# Patient Record
Sex: FEMALE | Race: WHITE | Employment: FULL TIME | URBAN - METROPOLITAN AREA
[De-identification: names, ages, dates, MRNs, and addresses within clinical notes are randomized per-mention and may not be internally consistent; named-entity substitution may affect disease eponyms.]

---

## 2017-04-25 PROBLEM — L84 CORN OF TOE: Status: ACTIVE | Noted: 2017-04-25

## 2017-04-25 PROBLEM — M19.049 PRIMARY OSTEOARTHRITIS OF HAND: Status: ACTIVE | Noted: 2017-04-25

## 2017-10-31 PROBLEM — G56.00 CTS (CARPAL TUNNEL SYNDROME): Status: ACTIVE | Noted: 2017-10-31

## 2018-06-07 PROBLEM — M25.569 KNEE PAIN: Status: ACTIVE | Noted: 2018-06-07

## 2018-12-06 PROBLEM — L29.9 PRURITUS: Status: ACTIVE | Noted: 2018-12-06

## 2019-06-04 PROBLEM — M25.519 SHOULDER PAIN: Status: ACTIVE | Noted: 2019-06-04

## 2019-06-04 PROBLEM — E66.9 OBESITY: Status: ACTIVE | Noted: 2019-06-04

## 2020-04-06 PROBLEM — R74.01 TRANSAMINITIS: Status: ACTIVE | Noted: 2020-04-06

## 2021-01-04 ENCOUNTER — HOSPITAL ENCOUNTER (OUTPATIENT)
Dept: SLEEP MEDICINE | Age: 66
Discharge: HOME | End: 2021-01-04
Payer: COMMERCIAL

## 2021-01-04 PROCEDURE — 95811 POLYSOM 6/>YRS CPAP 4/> PARM: CPT

## 2022-03-18 PROBLEM — M25.569 KNEE PAIN: Status: ACTIVE | Noted: 2018-06-07

## 2022-03-18 PROBLEM — L84 CORN OF TOE: Status: ACTIVE | Noted: 2017-04-25

## 2022-03-18 PROBLEM — L29.9 PRURITUS: Status: ACTIVE | Noted: 2018-12-06

## 2022-03-19 PROBLEM — E66.9 OBESITY: Status: ACTIVE | Noted: 2019-06-04

## 2022-03-19 PROBLEM — M25.519 SHOULDER PAIN: Status: ACTIVE | Noted: 2019-06-04

## 2022-03-19 PROBLEM — R74.01 TRANSAMINITIS: Status: ACTIVE | Noted: 2020-04-06

## 2022-03-19 PROBLEM — G56.00 CTS (CARPAL TUNNEL SYNDROME): Status: ACTIVE | Noted: 2017-10-31

## 2022-03-19 PROBLEM — M19.049 PRIMARY OSTEOARTHRITIS OF HAND: Status: ACTIVE | Noted: 2017-04-25

## 2022-05-26 ENCOUNTER — OFFICE VISIT (OUTPATIENT)
Dept: INTERNAL MEDICINE CLINIC | Facility: CLINIC | Age: 67
End: 2022-05-26
Payer: MEDICARE

## 2022-05-26 VITALS
OXYGEN SATURATION: 98 % | HEART RATE: 79 BPM | RESPIRATION RATE: 16 BRPM | SYSTOLIC BLOOD PRESSURE: 131 MMHG | BODY MASS INDEX: 29.23 KG/M2 | TEMPERATURE: 98.8 F | WEIGHT: 165 LBS | DIASTOLIC BLOOD PRESSURE: 67 MMHG | HEIGHT: 63 IN

## 2022-05-26 DIAGNOSIS — I10 BENIGN ESSENTIAL HYPERTENSION: ICD-10-CM

## 2022-05-26 DIAGNOSIS — Z78.0 POST-MENOPAUSAL: ICD-10-CM

## 2022-05-26 DIAGNOSIS — E78.5 HYPERLIPIDEMIA, UNSPECIFIED HYPERLIPIDEMIA TYPE: Primary | ICD-10-CM

## 2022-05-26 DIAGNOSIS — E11.9 CONTROLLED TYPE 2 DIABETES MELLITUS WITHOUT COMPLICATION, UNSPECIFIED WHETHER LONG TERM INSULIN USE (HCC): ICD-10-CM

## 2022-05-26 DIAGNOSIS — Z23 ENCOUNTER FOR IMMUNIZATION: ICD-10-CM

## 2022-05-26 DIAGNOSIS — Z79.899 ENCOUNTER FOR LONG-TERM (CURRENT) DRUG USE: ICD-10-CM

## 2022-05-26 PROCEDURE — 1123F ACP DISCUSS/DSCN MKR DOCD: CPT | Performed by: INTERNAL MEDICINE

## 2022-05-26 PROCEDURE — 99214 OFFICE O/P EST MOD 30 MIN: CPT | Performed by: INTERNAL MEDICINE

## 2022-05-26 RX ORDER — ZOLPIDEM TARTRATE 10 MG/1
10 TABLET ORAL
COMMUNITY
Start: 2022-05-05 | End: 2022-07-29 | Stop reason: DRUGHIGH

## 2022-05-26 ASSESSMENT — PATIENT HEALTH QUESTIONNAIRE - PHQ9
SUM OF ALL RESPONSES TO PHQ QUESTIONS 1-9: 2
SUM OF ALL RESPONSES TO PHQ9 QUESTIONS 1 & 2: 2
SUM OF ALL RESPONSES TO PHQ QUESTIONS 1-9: 2
1. LITTLE INTEREST OR PLEASURE IN DOING THINGS: 1
2. FEELING DOWN, DEPRESSED OR HOPELESS: 1

## 2022-05-26 NOTE — PROGRESS NOTES
[unfilled]   Location:[facility] [address] [facility]  [place]  Patient #:  [id number]  YOB: 1955        History of Present Illness     Chief Complaint   Patient presents with    Follow-up Chronic Condition     1 year f/u       Ms. Mirian Martinez is a 73 y.o. female  who presents for office visit. Chronic medical issues assessed today diabetes, hypertension, insomnia, JUAN RAMON on CPAP, anxiety and depression  Has not been check ing her BS at all. Has not been exercising regularly. Last eye exam was okay in [place]  Keeps regular dental visits last week. JUAN RAMON on CPAP uses nightly with good results.    Last Labs  CBC:   Lab Results   Component Value Date    WBC 8.4 07/21/2021    RBC 4.56 07/21/2021    HGB 14.4 07/21/2021    HCT 43.5 07/21/2021    MCV 95 07/21/2021    MCH 31.6 07/21/2021    MCHC 33.1 07/21/2021    RDW 12.0 07/21/2021     07/21/2021     CMP:    Lab Results   Component Value Date     07/21/2021    K 3.6 07/21/2021    CL 98 07/21/2021    CO2 23 07/21/2021    BUN 21 07/21/2021    CREATININE 0.78 07/21/2021    GFRAA 92 07/21/2021    AGRATIO 2.0 07/21/2021    GLUCOSE 103 07/21/2021    PROT 7.5 07/21/2021    LABALBU 5.0 07/21/2021    CALCIUM 9.5 07/21/2021    BILITOT <0.2 07/21/2021    ALKPHOS 109 07/21/2021    AST 21 07/21/2021    ALT 22 07/21/2021     HgBA1c:    Lab Results   Component Value Date    LABA1C 6.3 07/21/2021     FLP:    Lab Results   Component Value Date    TRIG 80 01/04/2021    HDL 60 01/04/2021    LDLCALC 116 01/04/2021    LABVLDL 30 12/03/2019     TSH:    Lab Results   Component Value Date    TSH 1.460 07/21/2021       No Known Allergies  Past Medical History:   Diagnosis Date    Anxiety 10/28/2015    Benign essential hypertension 10/28/2015    Cervical radiculopathy 10/28/2015    Chest pain 10/28/2015    Constipation 10/28/2015    Dermatitis 10/28/2015    Diabetes mellitus type 2, controlled ([facility] 10/28/2015    Fibromyalgia 10/28/2015    Heart murmur     Hyperlipidemia 10/28/2015    Insomnia 10/28/2015    Osteoporosis 10/28/2015    Sleep apnea 10/28/2015    Venous (peripheral) insufficiency 10/28/2015    Ventricular septal defect 10/28/2015     Social History     Socioeconomic History    Marital status:      Spouse name: None    Number of children: None    Years of education: None    Highest education level: None   Occupational History    None   Tobacco Use    Smoking status: Never Smoker    Smokeless tobacco: Never Used   Substance and Sexual Activity    Alcohol use: No     Alcohol/week: 0.0 standard drinks    Drug use: No    Sexual activity: None   Other Topics Concern    None   Social History Narrative    None     Social Determinants of Health     Financial Resource Strain:     Difficulty of Paying Living Expenses: Not on file   Food Insecurity:     Worried About Running Out of Food in the Last Year: Not on file    Gadiel of Food in the Last Year: Not on file   Transportation Needs:     Lack of Transportation (Medical): Not on file    Lack of Transportation (Non-Medical):  Not on file   Physical Activity:     Days of Exercise per Week: Not on file    Minutes of Exercise per Session: Not on file   Stress:     Feeling of Stress : Not on file   Social Connections:     Frequency of Communication with Friends and Family: Not on file    Frequency of Social Gatherings with Friends and Family: Not on file    Attends Holiness Services: Not on file    Active Member of Clubs or Organizations: Not on file    Attends Club or Organization Meetings: Not on file    Marital Status: Not on file   Intimate Partner Violence:     Fear of Current or Ex-Partner: Not on file    Emotionally Abused: Not on file    Physically Abused: Not on file    Sexually Abused: Not on file   Housing Stability:     Unable to Pay for Housing in the Last Year: Not on file    Number of [place] in the Last Year: Not on file  Unstable Housing in the Last Year: Not on file     Past Surgical History:   Procedure Laterality Date    FABRIZIO AND BSO  12/1998   [facility]     History reviewed. No pertinent family history. Current Outpatient Medications   Medication Sig Dispense Refill    zolpidem (AMBIEN) 10 MG tablet Take 10 mg by mouth.  amLODIPine-benazepril (LOTREL) 10-40 MG per capsule TAKE 1 CAPSULE BY MOUTH EVERY DAY      chlorthalidone (HYGROTON) 25 MG tablet Take 12.5 mg by mouth daily      linaclotide (LINZESS) 145 MCG capsule Take 145 mcg by mouth every morning (before breakfast)      LORazepam (ATIVAN) 1 MG tablet TAKE 1 TABLET BY MOUTH TWICE DAILY AS NEEDED FOR ANXIETY      metoprolol succinate (TOPROL XL) 100 MG extended release tablet TAKE 1/2 TABLET BY MOUTH TWICE DAILY      simvastatin (ZOCOR) 40 MG tablet TAKE 1 TABLET BY MOUTH EVERY DAY AT NIGHT      spironolactone (ALDACTONE) 25 MG tablet TAKE 1 TABLET BY MOUTH EVERY DAY FOR HIGH BLOOD PRESSURE      venlafaxine (EFFEXOR XR) 150 MG extended release capsule TAKE 1 CAPSULE BY MOUTH EVERY DAY       No current facility-administered medications for this visit.      Health Maintenance   Topic Date Due    Annual Wellness Visit (AWV)  Never done    Pneumococcal 65+ years Vaccine (1 - PCV) Never done    Diabetic foot exam  Never done    Lipids  Never done    HIV screen  Never done    Diabetic microalbuminuria test  Never done    Diabetic retinal exam  Never done    DTaP/Tdap/Td vaccine (1 - Tdap) Never done    Shingles vaccine (1 of 2) Never done    DEXA (modify frequency per FRAX score)  Never done    COVID-19 Vaccine (3 - Booster for Moderna series) 09/13/2021    Depression Screen  07/20/2022    A1C test (Diabetic or Prediabetic)  07/21/2022    Flu vaccine (Season Ended) 09/01/2022    Breast cancer screen  02/25/2024    Colorectal Cancer Screen  12/20/2028    Hepatitis C screen  Completed    Hepatitis A vaccine  Aged Out    Hib vaccine Aged Out    Meningococcal (ACWY) vaccine  Aged Out             Review of Systems  Review of Systems   Constitutional: Negative for fever. Respiratory: Negative for cough and shortness of breath. Cardiovascular: Negative for chest pain. Gastrointestinal: Negative for abdominal pain. Genitourinary: Negative for difficulty urinating. Musculoskeletal: Positive for arthralgias. /67 (Site: Left Upper Arm, Position: Sitting)   Pulse 79   Temp 98.8 °F (37.1 °C) (Temporal)   Resp 16   Ht 5' 3.25\" (1.607 m)   Wt 165 lb (74.8 kg)   SpO2 98%   BMI 29.00 kg/m²       Physical Exam    Physical Exam  Vitals and nursing note reviewed. Constitutional:       General: She is not in acute distress. Appearance: Normal appearance. She is well-groomed and overweight. She is not ill-appearing. HENT:      Head: Normocephalic and atraumatic. Right Ear: Tympanic membrane normal.      Left Ear: Tympanic membrane normal.      Nose: Nose normal.      Mouth/Throat:      Lips: Pink. Mouth: Mucous membranes are moist.   Eyes:      General: Lids are normal.      Extraocular Movements: Extraocular movements intact. Conjunctiva/sclera: Conjunctivae normal.      Pupils: Pupils are equal, round, and reactive to light. Neck:      Thyroid: No thyromegaly. Vascular: No carotid bruit. Cardiovascular:      Rate and Rhythm: Normal rate and regular rhythm. Pulses: Normal pulses. Pulmonary:      Effort: Pulmonary effort is normal.      Breath sounds: Normal breath sounds and air entry. Chest:   Breasts:      Right: Normal. No axillary adenopathy or supraclavicular adenopathy. Left: Normal. No mass, axillary adenopathy or supraclavicular adenopathy. Musculoskeletal:      Right lower leg: No edema. Left lower leg: No edema. Lymphadenopathy:      Cervical: No cervical adenopathy. Upper Body:      Right upper body: No supraclavicular or axillary adenopathy.       Left upper body: No supraclavicular or axillary adenopathy. Neurological:      Mental Status: She is alert. Psychiatric:         Behavior: Behavior is cooperative. Diabetic foot exam:   Left Foot:   Visual Exam: callous- medial great toe   Pulse DP: 2+ (normal)   Filament test: normal sensation   Vibratory Sensation: normal  Right Foot:   Visual Exam: callous- medial great toe   Pulse DP: 2+ (normal)   Filament test: normal sensation   Vibratory Sensation: normal      Wt Readings from Last 3 Encounters:   05/26/22 165 lb (74.8 kg)   07/20/21 167 lb (75.8 kg)       Assessment & Plan    Encounter Diagnoses   Name Primary?  Hyperlipidemia, unspecified hyperlipidemia type Yes    Controlled type 2 diabetes mellitus without complication, unspecified whether long term insulin use (HCC)     Benign essential hypertension     Encounter for long-term (current) drug use     Encounter for immunization     Post-menopausal            Orders Placed This Encounter   Procedures    DEXA Bone Density Axial Skeleton     Standing Status:   Future     Standing Expiration Date:   5/26/2023    Pneumococcal, PREVNAR 20, PCV20, (age 25 yrs+), IM, PF    Microalbumin / Creatinine Urine Ratio     Standing Status:   Future     Standing Expiration Date:   5/26/2023    CBC with Auto Differential     Standing Status:   Future     Standing Expiration Date:   5/26/2023    Comprehensive Metabolic Panel     Standing Status:   Future     Standing Expiration Date:   5/26/2023    TSH     Standing Status:   Future     Standing Expiration Date:   5/26/2023    Lipid Panel     Standing Status:   Future     Standing Expiration Date:   5/26/2023     Order Specific Question:   Is Patient Fasting?/# of Hours     Answer:   fasting    Hemoglobin A1C     Standing Status:   Future     Standing Expiration Date:   5/26/2023       Return for fasting labs. Check labs to assess diabetes control, renal fxn, lipids, and thyroid.   Discussed the importance of regular exercise and a healthy diet. Discussed the importance of regular breast exams and mammograms. BSE reviewed with patient. Reviewed recommended screenings      Return in about 6 months (around 11/26/2022) for routine follow up of chronic medical issues, as needed for new or worsening problems.       Jyothi Reyes MD

## 2022-05-27 DIAGNOSIS — Z00.00 ROUTINE GENERAL MEDICAL EXAMINATION AT A HEALTH CARE FACILITY: Primary | ICD-10-CM

## 2022-05-29 ASSESSMENT — ENCOUNTER SYMPTOMS
ABDOMINAL PAIN: 0
SHORTNESS OF BREATH: 0
COUGH: 0

## 2022-05-31 PROCEDURE — 90677 PCV20 VACCINE IM: CPT | Performed by: INTERNAL MEDICINE

## 2022-06-01 ENCOUNTER — NURSE ONLY (OUTPATIENT)
Dept: INTERNAL MEDICINE CLINIC | Facility: CLINIC | Age: 67
End: 2022-06-01

## 2022-06-01 DIAGNOSIS — I10 BENIGN ESSENTIAL HYPERTENSION: ICD-10-CM

## 2022-06-01 DIAGNOSIS — E11.9 CONTROLLED TYPE 2 DIABETES MELLITUS WITHOUT COMPLICATION, UNSPECIFIED WHETHER LONG TERM INSULIN USE (HCC): ICD-10-CM

## 2022-06-01 DIAGNOSIS — E78.5 HYPERLIPIDEMIA, UNSPECIFIED HYPERLIPIDEMIA TYPE: ICD-10-CM

## 2022-06-02 LAB
ALBUMIN SERPL-MCNC: 4 G/DL (ref 3.2–4.6)
ALBUMIN/GLOB SERPL: 1 {RATIO} (ref 1.2–3.5)
ALP SERPL-CCNC: 112 U/L (ref 50–136)
ALT SERPL-CCNC: 44 U/L (ref 12–65)
ANION GAP SERPL CALC-SCNC: 6 MMOL/L (ref 7–16)
AST SERPL-CCNC: 20 U/L (ref 15–37)
BASOPHILS # BLD: 0 K/UL (ref 0–0.2)
BASOPHILS NFR BLD: 1 % (ref 0–2)
BILIRUB SERPL-MCNC: 0.4 MG/DL (ref 0.2–1.1)
BUN SERPL-MCNC: 22 MG/DL (ref 8–23)
CALCIUM SERPL-MCNC: 9.7 MG/DL (ref 8.3–10.4)
CHLORIDE SERPL-SCNC: 102 MMOL/L (ref 98–107)
CHOLEST SERPL-MCNC: 155 MG/DL
CO2 SERPL-SCNC: 28 MMOL/L (ref 21–32)
CREAT SERPL-MCNC: 0.8 MG/DL (ref 0.6–1)
CREAT UR-MCNC: 197 MG/DL
DIFFERENTIAL METHOD BLD: NORMAL
EOSINOPHIL # BLD: 0.3 K/UL (ref 0–0.8)
EOSINOPHIL NFR BLD: 4 % (ref 0.5–7.8)
ERYTHROCYTE [DISTWIDTH] IN BLOOD BY AUTOMATED COUNT: 11.9 % (ref 11.9–14.6)
EST. AVERAGE GLUCOSE BLD GHB EST-MCNC: 146 MG/DL
GLOBULIN SER CALC-MCNC: 3.9 G/DL (ref 2.3–3.5)
GLUCOSE SERPL-MCNC: 165 MG/DL (ref 65–100)
HBA1C MFR BLD: 6.7 % (ref 4.2–6.3)
HCT VFR BLD AUTO: 41.8 % (ref 35.8–46.3)
HDLC SERPL-MCNC: 37 MG/DL (ref 40–60)
HDLC SERPL: 4.2 {RATIO}
HGB BLD-MCNC: 14.1 G/DL (ref 11.7–15.4)
IMM GRANULOCYTES # BLD AUTO: 0 K/UL (ref 0–0.5)
IMM GRANULOCYTES NFR BLD AUTO: 1 % (ref 0–5)
LDLC SERPL CALC-MCNC: 88.8 MG/DL
LYMPHOCYTES # BLD: 1.7 K/UL (ref 0.5–4.6)
LYMPHOCYTES NFR BLD: 22 % (ref 13–44)
MCH RBC QN AUTO: 30.5 PG (ref 26.1–32.9)
MCHC RBC AUTO-ENTMCNC: 33.7 G/DL (ref 31.4–35)
MCV RBC AUTO: 90.3 FL (ref 79.6–97.8)
MICROALBUMIN UR-MCNC: 3.89 MG/DL
MICROALBUMIN/CREAT UR-RTO: 20 MG/G
MONOCYTES # BLD: 0.7 K/UL (ref 0.1–1.3)
MONOCYTES NFR BLD: 10 % (ref 4–12)
NEUTS SEG # BLD: 4.8 K/UL (ref 1.7–8.2)
NEUTS SEG NFR BLD: 62 % (ref 43–78)
NRBC # BLD: 0 K/UL (ref 0–0.2)
PLATELET # BLD AUTO: 323 K/UL (ref 150–450)
PMV BLD AUTO: 10.1 FL (ref 9.4–12.3)
POTASSIUM SERPL-SCNC: 4.2 MMOL/L (ref 3.5–5.1)
PROT SERPL-MCNC: 7.9 G/DL (ref 6.3–8.2)
RBC # BLD AUTO: 4.63 M/UL (ref 4.05–5.2)
SODIUM SERPL-SCNC: 136 MMOL/L (ref 136–145)
TRIGL SERPL-MCNC: 146 MG/DL (ref 35–150)
TSH, 3RD GENERATION: 1.24 UIU/ML (ref 0.36–3.74)
VLDLC SERPL CALC-MCNC: 29.2 MG/DL (ref 6–23)
WBC # BLD AUTO: 7.6 K/UL (ref 4.3–11.1)

## 2022-06-07 ENCOUNTER — TELEPHONE (OUTPATIENT)
Dept: INTERNAL MEDICINE CLINIC | Facility: CLINIC | Age: 67
End: 2022-06-07

## 2022-06-27 ENCOUNTER — HOSPITAL ENCOUNTER (OUTPATIENT)
Dept: MAMMOGRAPHY | Age: 67
Discharge: HOME | End: 2022-06-30
Payer: MEDICARE

## 2022-06-27 DIAGNOSIS — Z78.0 POST-MENOPAUSAL: ICD-10-CM

## 2022-06-27 PROCEDURE — 77080 DXA BONE DENSITY AXIAL: CPT

## 2022-07-20 ENCOUNTER — OFFICE VISIT (OUTPATIENT)
Dept: INTERNAL MEDICINE CLINIC | Facility: CLINIC | Age: 67
End: 2022-07-20
Payer: MEDICARE

## 2022-07-20 VITALS
TEMPERATURE: 97.3 F | HEART RATE: 65 BPM | WEIGHT: 168 LBS | HEIGHT: 63 IN | RESPIRATION RATE: 16 BRPM | DIASTOLIC BLOOD PRESSURE: 67 MMHG | SYSTOLIC BLOOD PRESSURE: 130 MMHG | OXYGEN SATURATION: 99 % | BODY MASS INDEX: 29.77 KG/M2

## 2022-07-20 DIAGNOSIS — R21 RASH AND NONSPECIFIC SKIN ERUPTION: ICD-10-CM

## 2022-07-20 DIAGNOSIS — L03.116 CELLULITIS OF LEFT LOWER EXTREMITY: Primary | ICD-10-CM

## 2022-07-20 PROCEDURE — 1123F ACP DISCUSS/DSCN MKR DOCD: CPT | Performed by: NURSE PRACTITIONER

## 2022-07-20 PROCEDURE — 99213 OFFICE O/P EST LOW 20 MIN: CPT | Performed by: NURSE PRACTITIONER

## 2022-07-20 RX ORDER — HYDROXYZINE HYDROCHLORIDE 25 MG/1
25 TABLET, FILM COATED ORAL
Qty: 15 TABLET | Refills: 0 | Status: SHIPPED | OUTPATIENT
Start: 2022-07-20 | End: 2022-08-19

## 2022-07-20 RX ORDER — CEPHALEXIN 500 MG/1
500 CAPSULE ORAL 3 TIMES DAILY
Qty: 15 CAPSULE | Refills: 0 | Status: SHIPPED | OUTPATIENT
Start: 2022-07-20 | End: 2022-07-25

## 2022-07-20 ASSESSMENT — PATIENT HEALTH QUESTIONNAIRE - PHQ9
SUM OF ALL RESPONSES TO PHQ QUESTIONS 1-9: 0
SUM OF ALL RESPONSES TO PHQ9 QUESTIONS 1 & 2: 0
1. LITTLE INTEREST OR PLEASURE IN DOING THINGS: 0
2. FEELING DOWN, DEPRESSED OR HOPELESS: 0

## 2022-07-20 ASSESSMENT — ENCOUNTER SYMPTOMS
SHORTNESS OF BREATH: 0
COLOR CHANGE: 1
WHEEZING: 0

## 2022-07-20 NOTE — PROGRESS NOTES
7/20/2022 11:01 AM  Location:[facility] [address] [facility]  [place]  Patient #:  [id number]  YOB: 1955          YOUR LAST HEMOGLOBIN A1CS:   No results found for: HBA1C, [id number]    YOUR LAST LIPID PROFILE:   Lab Results   Component Value Date/Time    CHOL 155 06/01/2022 08:32 AM    HDL 37 06/01/2022 08:32 AM    VLDL 15 01/04/2021 11:08 AM         Lab Results   Component Value Date/Time    GFRAA >60 06/01/2022 08:32 AM    BUN 22 06/01/2022 08:32 AM     06/01/2022 08:32 AM    K 4.2 06/01/2022 08:32 AM     06/01/2022 08:32 AM    CO2 28 06/01/2022 08:32 AM           History of Present Illness     Chief Complaint   Patient presents with    Insect Bite     X 1 week       Ms. Penelope Monroe is a 78 y.o. female  who presents for itchy skin rash. Patient reports 12 days ago she was sitting on her front porch on a Friday evening. She awoke Saturday morning itching and noticed red lesions develop to her lower legs bilaterally. She reports continued itching, somewhat improved with Zyrtec. She denies any purulent drainage, streaking redness, fevers or chills, nausea or vomiting. She is not a diabetic. She denies wheezing. She denies medication or dietary changes. She denies any exposure to any recent illness. Reports scattered itchy places to arms and chest as well. She did not see a particular insect but notes that across the street a new house is being built and her neighbor told her that there was a particular bug that lives in the soil that could have cause her rash and so she became worried and came in for evaluation. Rash  This is a new problem. The current episode started 1 to 4 weeks ago. The problem is unchanged. The affected locations include the right lower leg, right upper leg, left upper leg and left lower leg. The rash is characterized by itchiness and redness. She was exposed to an insect bite/sting.  Pertinent negatives include no anorexia, facial edema, fatigue, fever or shortness of breath. Past treatments include antihistamine. The treatment provided moderate relief. There is no history of allergies, asthma, eczema or varicella. No Known Allergies  Past Medical History:   Diagnosis Date    Anxiety 10/28/2015    Benign essential hypertension 10/28/2015    Cervical radiculopathy 10/28/2015    Chest pain 10/28/2015    Constipation 10/28/2015    Dermatitis 10/28/2015    Diabetes mellitus type 2, controlled ([facility] 10/28/2015    Fibromyalgia 10/28/2015    Heart murmur     Hyperlipidemia 10/28/2015    Insomnia 10/28/2015    Osteoporosis 10/28/2015    Sleep apnea 10/28/2015    Venous (peripheral) insufficiency 10/28/2015    Ventricular septal defect 10/28/2015     Social History     Socioeconomic History    Marital status:      Spouse name: None    Number of children: None    Years of education: None    Highest education level: None   Tobacco Use    Smoking status: Never    Smokeless tobacco: Never   Substance and Sexual Activity    Alcohol use: No     Alcohol/week: 0.0 standard drinks    Drug use: No     Past Surgical History:   Procedure Laterality Date    FABRIZIO AND BSO (CERVIX REMOVED)  12/1998    TUBAL LIGATION  1984     Current Outpatient Medications   Medication Sig Dispense Refill    zolpidem (AMBIEN) 10 MG tablet Take 10 mg by mouth.       amLODIPine-benazepril (LOTREL) 10-40 MG per capsule TAKE 1 CAPSULE BY MOUTH EVERY DAY      chlorthalidone (HYGROTON) 25 MG tablet Take 12.5 mg by mouth daily      linaclotide (LINZESS) 145 MCG capsule Take 145 mcg by mouth every morning (before breakfast)      LORazepam (ATIVAN) 1 MG tablet TAKE 1 TABLET BY MOUTH TWICE DAILY AS NEEDED FOR ANXIETY      metoprolol succinate (TOPROL XL) 100 MG extended release tablet TAKE 1/2 TABLET BY MOUTH TWICE DAILY      simvastatin (ZOCOR) 40 MG tablet TAKE 1 TABLET BY MOUTH EVERY DAY AT NIGHT      spironolactone (ALDACTONE) 25 MG tablet TAKE 1 TABLET BY MOUTH EVERY DAY FOR HIGH BLOOD PRESSURE      venlafaxine (EFFEXOR XR) 150 MG extended release capsule TAKE 1 CAPSULE BY MOUTH EVERY DAY       No current facility-administered medications for this visit. Health Maintenance   Topic Date Due    DTaP/Tdap/Td vaccine (1 - Tdap) Never done    Shingles vaccine (1 of 2) Never done    Diabetic foot exam  07/01/2021    COVID-19 Vaccine (3 - Booster for [employer] series) 09/13/2021    Diabetic retinal exam  06/16/2022    Flu vaccine (1) 09/01/2022    Annual Wellness Visit (AWV)  04/06/2023    Depression Screen  05/26/2023    A1C test (Diabetic or Prediabetic)  06/01/2023    Diabetic microalbuminuria test  06/01/2023    Lipids  06/01/2023    Breast cancer screen  02/25/2024    Colorectal Cancer Screen  12/20/2028    DEXA (modify frequency per FRAX score)  Completed    Pneumococcal 65+ years Vaccine  Completed    Hepatitis C screen  Completed    Hepatitis A vaccine  Aged Out    Hib vaccine  Aged Out    Meningococcal (ACWY) vaccine  Aged Out     No family history on file. Review of Systems  Review of Systems   Constitutional:  Negative for chills, fatigue and fever. Respiratory:  Negative for shortness of breath and wheezing. Cardiovascular:  Negative for chest pain, palpitations and leg swelling. Gastrointestinal:  Negative for anorexia. Skin:  Positive for color change, rash and wound. All other systems reviewed and are negative. /67 (Site: Right Upper Arm, Position: Sitting)   Pulse 65   Temp 97.3 °F (36.3 °C) (Temporal)   Resp 16   Ht 5' 3.25\" (1.607 m)   Wt 168 lb (76.2 kg)   SpO2 99%   BMI 29.53 kg/m²       Physical Exam    Physical Exam  Vitals and nursing note reviewed. Constitutional:       General: She is not in acute distress. Appearance: She is not ill-appearing, toxic-appearing or diaphoretic. HENT:      Mouth/Throat:      Mouth: Mucous membranes are moist.   Cardiovascular:      Rate and Rhythm: Regular rhythm.    Pulmonary:      Effort: No day, avoid itching  - triamcinolone (KENALOG) 0.1 % ointment; Apply topically 2 times daily for 7 days  Dispense: 1 each; Refill: 0  - hydrOXYzine HCl (ATARAX) 25 MG tablet; Take 1 tablet by mouth nightly as needed for Itching  Dispense: 15 tablet;  Refill: 0        Eli Sanchez NP, APRN - CNP

## 2022-07-28 NOTE — PROGRESS NOTES
The patient is seen by synchronous (real-time) audio-video technology on D[employer]. me. Consent:  The patient/healthcare decision maker is aware that this patient-initiated Telehealth encounter is a billable service, with coverage as determined by his insurance carrier. The patient is aware that he/she may receive a bill and has provided verbal consent to proceed: Yes     I was at the office working remotely while conducting this visit. We discussed the expected course, resolution and complications of the diagnosis(es) in detail. Medication risks, benefits, costs, interactions, and alternatives were discussed as indicated. I advised him to contact the office if his condition worsens, changes or fails to improve as anticipated. He expressed understanding with the diagnosis(es) and plan. Pursuant to the emergency declaration under the [address] [place] [address], [facility] waiver authority and the [employer] Act, this Virtual  Visit was conducted, with patient's consent, to reduce the patient's risk of exposure to COVID-19 and provide continuity of care for an established patient. Services were provided through a video synchronous discussion virtually to substitute for in-person clinic visit.                      [address]bie [address] [place] [address], [address] [place] [address]  [phone]    Patient ID:  Name: Jackson Kamara  MRN: [id number]  AGE: 78 y.o.  : 1955          Office Visit 2022    CHIEF COMPLAINT:    Chief Complaint   Patient presents with    CPAP/BiPAP    Medication Refill    Follow-up       HISTORY OF PRESENT ILLNESS:    Pt is a 79 yo  female with a history of fibromyalgia, HTN, HLD, VSD, PVD, and DM2. Pt had a PSG/HST on 21 with an AHI of 22.5/hr with desaturations  to 72%. She is on CPAP 14cm H2O. Pt is seen today for follow up. Pt reports that she has not been using her machine like she is supposed to. She hasn't been feeling very well. She thinks that she had COVID because several other family members have had it. She has not tested it for it. She is just now starting to feel a little bit better. She is taking ambien 10mg. She reports that some nights she is able to sleep and other nights, she is awake all night long. We discussed trying the controlled release Ambien and she is agreeable to try it. She has only used CPAP 47/83 days with an average use of 4 hrs and 30 mins per night. Pt has a mask leak of 1.6L/min with an AHI of 2.1/hr. Pt is benefiting and tolerating from PAP therapy.        ALLERGIES: No Known Allergies       Past Medical History:   Past Medical History:   Diagnosis Date    Anxiety 10/28/2015    Benign essential hypertension 10/28/2015    Cervical radiculopathy 10/28/2015    Chest pain 10/28/2015    Constipation 10/28/2015    Dermatitis 10/28/2015    Diabetes mellitus type 2, controlled ([address] 10/28/2015    Fibromyalgia 10/28/2015    Heart murmur     Hyperlipidemia 10/28/2015    Insomnia 10/28/2015    Osteoporosis 10/28/2015    Sleep apnea 10/28/2015    Venous (peripheral) insufficiency 10/28/2015    Ventricular septal defect 10/28/2015         Problem List:  Patient Active Problem List   Diagnosis    Hyperlipidemia    Ventricular septal defect    Pruritus    Constipation    Knee pain    Fibromyalgia    Corn of toe    CTS (carpal tunnel syndrome)    Benign essential hypertension    Osteoporosis    Venous (peripheral) insufficiency    Transaminitis    Encounter for long-term (current) drug use    Insomnia    Cervical radiculopathy    Diabetes mellitus type 2, controlled ([address]    Sleep apnea    Generalized anxiety disorder    Primary osteoarthritis of hand    Obesity    Shoulder pain    Dermatitis           Surgical History:   Past Surgical History:   Procedure Laterality Date    FABRIZIO AND BSO (CERVIX REMOVED)  12/1998    TUBAL LIGATION  1984       Pulmonary Studies: No flowsheet data found. Social History:   Social History     Socioeconomic History    Marital status:      Spouse name: Not on file    Number of children: Not on file    Years of education: Not on file    Highest education level: Not on file   Occupational History    Not on file   Tobacco Use    Smoking status: Never    Smokeless tobacco: Never   Substance and Sexual Activity    Alcohol use: No     Alcohol/week: 0.0 standard drinks    Drug use: No    Sexual activity: Not on file   Other Topics Concern    Not on file   Social History Narrative    Not on file     Social Determinants of Health     Financial Resource Strain: Not on file   Food Insecurity: Not on file   Transportation Needs: Not on file   Physical Activity: Not on file   Stress: Not on file   Social Connections: Not on file   Intimate Partner Violence: Not on file   Housing Stability: Not on file         Family History: History reviewed. No pertinent family history. Patient Medications:   Current Outpatient Medications   Medication Sig    zolpidem (AMBIEN CR) 12.5 MG extended release tablet Take 1 tablet by mouth nightly as needed for Sleep for up to 180 days.     hydrOXYzine HCl (ATARAX) 25 MG tablet Take 1 tablet by mouth nightly as needed for Itching    amLODIPine-benazepril (LOTREL) 10-40 MG per capsule TAKE 1 CAPSULE BY MOUTH EVERY DAY    chlorthalidone (HYGROTON) 25 MG tablet Take 12.5 mg by mouth daily    linaclotide (LINZESS) 145 MCG capsule Take 145 mcg by mouth every morning (before breakfast)    LORazepam (ATIVAN) 1 MG tablet TAKE 1 TABLET BY MOUTH TWICE DAILY AS NEEDED FOR ANXIETY    metoprolol succinate (TOPROL XL) 100 MG extended release tablet TAKE 1/2 TABLET BY MOUTH TWICE DAILY    simvastatin (ZOCOR) 40 MG tablet TAKE 1 TABLET BY MOUTH EVERY DAY AT NIGHT    spironolactone (ALDACTONE) 25 MG tablet TAKE 1 TABLET BY MOUTH EVERY DAY FOR HIGH BLOOD PRESSURE    venlafaxine (EFFEXOR XR) 150 MG extended release capsule TAKE 1 CAPSULE BY MOUTH EVERY DAY     No current facility-administered medications for this visit. .Review of Systems - General ROS: positive for  - fatigue  Psychological ROS: positive for - anxiety and depression  Ophthalmic ROS: positive for - uses glasses  ENT ROS: positive for - nasal congestion  Allergy and Immunology ROS: positive for - seasonal allergies  Hematological and Lymphatic ROS: positive for - bruising  Respiratory ROS: no cough, shortness of breath, or wheezing  Cardiovascular ROS: no chest pain or dyspnea on exertion  Gastrointestinal ROS: no abdominal pain, change in bowel habits, or black or bloody stools  Genito-Urinary ROS: positive for - nocturia  Musculoskeletal ROS: positive for - joint pain and joint stiffness         PHYSICAL EXAM:    There were no vitals filed for this visit. Physical Exam:  GENERAL APPEARANCE:   The patient is normal weight and in no respiratory distress. HEENT: AT/NC. LUNGS:   Normal respiratory effort with symmetrical lung expansion. NEURO:   The patient is alert and oriented to person, place, and time. Memory appears intact and mood is normal.  No gross sensorimotor deficits are present. ASSESSMENT:         Diagnosis Orders   1. Obstructive sleep apnea syndrome -resume use of CPAP. 2. Insomnia, unspecified type -pt will try Ambien CR. She is aware that this will not be continued to be refilled if she is not compliant with PAP therapy as Rafael Álvarez can make her sleep apnea worse. zolpidem (AMBIEN CR) 12.5 MG extended release tablet             Medical Decision Making:       Orders:   Orders Placed This Encounter    zolpidem (AMBIEN CR) 12.5 MG extended release tablet     Sig: Take 1 tablet by mouth nightly as needed for Sleep for up to 180 days. Dispense:  30 tablet     Refill:  5          Dr. Manjeet Gomez was the onsite physician. He was available to answer any questions.        Time spent in preparation, chart review, imaging review and direct patient care was 27 minutes         Patricia Dangma  7/29/2022,    Electronically signed

## 2022-07-29 ENCOUNTER — CLINICAL DOCUMENTATION (OUTPATIENT)
Dept: SLEEP MEDICINE | Age: 67
End: 2022-07-29

## 2022-07-29 ENCOUNTER — TELEMEDICINE (OUTPATIENT)
Dept: SLEEP MEDICINE | Age: 67
End: 2022-07-29
Payer: MEDICARE

## 2022-07-29 DIAGNOSIS — G47.33 OBSTRUCTIVE SLEEP APNEA SYNDROME: Primary | ICD-10-CM

## 2022-07-29 DIAGNOSIS — G47.00 INSOMNIA, UNSPECIFIED TYPE: ICD-10-CM

## 2022-07-29 PROCEDURE — 1123F ACP DISCUSS/DSCN MKR DOCD: CPT | Performed by: PHYSICIAN ASSISTANT

## 2022-07-29 PROCEDURE — 99213 OFFICE O/P EST LOW 20 MIN: CPT | Performed by: PHYSICIAN ASSISTANT

## 2022-07-29 RX ORDER — ZOLPIDEM TARTRATE 12.5 MG/1
12.5 TABLET, FILM COATED, EXTENDED RELEASE ORAL NIGHTLY PRN
Qty: 30 TABLET | Refills: 5 | Status: SHIPPED | OUTPATIENT
Start: 2022-07-29 | End: 2023-01-25

## 2022-07-29 ASSESSMENT — SLEEP AND FATIGUE QUESTIONNAIRES
HOW LIKELY ARE YOU TO NOD OFF OR FALL ASLEEP WHILE SITTING INACTIVE IN A PUBLIC PLACE: 0
HOW LIKELY ARE YOU TO NOD OFF OR FALL ASLEEP WHEN YOU ARE A PASSENGER IN A CAR FOR AN HOUR WITHOUT A BREAK: 2
HOW LIKELY ARE YOU TO NOD OFF OR FALL ASLEEP WHILE WATCHING TV: 1
HOW LIKELY ARE YOU TO NOD OFF OR FALL ASLEEP IN A CAR, WHILE STOPPED FOR A FEW MINUTES IN TRAFFIC: 0
HOW LIKELY ARE YOU TO NOD OFF OR FALL ASLEEP WHILE SITTING AND TALKING TO SOMEONE: 0
HOW LIKELY ARE YOU TO NOD OFF OR FALL ASLEEP WHILE SITTING AND READING: 0
HOW LIKELY ARE YOU TO NOD OFF OR FALL ASLEEP WHILE LYING DOWN TO REST IN THE AFTERNOON WHEN CIRCUMSTANCES PERMIT: 1
HOW LIKELY ARE YOU TO NOD OFF OR FALL ASLEEP WHILE SITTING QUIETLY AFTER LUNCH WITHOUT ALCOHOL: 0
ESS TOTAL SCORE: 4

## 2022-08-11 RX ORDER — VENLAFAXINE HYDROCHLORIDE 150 MG/1
CAPSULE, EXTENDED RELEASE ORAL
Qty: 90 CAPSULE | Refills: 3 | Status: SHIPPED | OUTPATIENT
Start: 2022-08-11

## 2022-08-15 DIAGNOSIS — F41.1 GENERALIZED ANXIETY DISORDER: Primary | ICD-10-CM

## 2022-08-15 RX ORDER — LORAZEPAM 1 MG/1
TABLET ORAL
Qty: 60 TABLET | Refills: 3 | Status: SHIPPED | OUTPATIENT
Start: 2022-08-15 | End: 2023-08-15

## 2022-10-10 ENCOUNTER — TELEPHONE (OUTPATIENT)
Dept: INTERNAL MEDICINE CLINIC | Facility: CLINIC | Age: 67
End: 2022-10-10

## 2022-10-10 NOTE — TELEPHONE ENCOUNTER
[facility] transfer called over for triage. Pt report having fatigue, headache, abd pain with loss of appetite that started on 10/06/22. Pt has not tested herself for covid. Pt has been informed to do a home test for covid and has been scheduled with our NP 10/11/22. Pt is aware if systems get worse go to the ER or an urgent care.

## 2022-10-11 ENCOUNTER — TELEMEDICINE (OUTPATIENT)
Dept: INTERNAL MEDICINE CLINIC | Facility: CLINIC | Age: 67
End: 2022-10-11
Payer: MEDICARE

## 2022-10-11 DIAGNOSIS — J06.9 VIRAL URI WITH COUGH: Primary | ICD-10-CM

## 2022-10-11 PROCEDURE — 99213 OFFICE O/P EST LOW 20 MIN: CPT | Performed by: NURSE PRACTITIONER

## 2022-10-11 PROCEDURE — 1123F ACP DISCUSS/DSCN MKR DOCD: CPT | Performed by: NURSE PRACTITIONER

## 2022-10-11 ASSESSMENT — ENCOUNTER SYMPTOMS
ABDOMINAL PAIN: 1
NAUSEA: 0
VOMITING: 0
DIARRHEA: 0
CONSTIPATION: 0
BLOOD IN STOOL: 0

## 2022-10-11 NOTE — PROGRESS NOTES
Bisi Pugh (:  1955) is a Established patient, here for evaluation of the following:    Assessment & Plan   Below is the assessment and plan developed based on review of pertinent history, physical exam, labs, studies, and medications. 1. Viral URI with cough  Symptoms seem to be resolving and today we discussed continuing supportive care with staying hydrated and taking a multivitamin supplement with vitamin C, D and zinc.  I did offer her a steroid pack for her wheezing and chest tightness which she declines. We also discussed that should she develop any localized abdominal pain that she be evaluated in person. She knows to call the office for any continued symptoms. We decided that as her symptoms are resolving there is no evidence of secondary bacterial infection at this point. Knows to call the office should this change. She appears well and nontoxic on video visit. Subjective   Ms. Yoan Wilkinson reports 5 days of general malaise, low-grade fever, initially she had some left-sided abdominal pain which was crampy in nature and not associated with any nausea or vomiting, diarrhea or constipation, melena or hematochezia. That has resolved and she reports that she has had some off-and-on ear pain as well as headaches, and intermittent dry cough with some chest tightness and wheezing, mild shortness of breath and body aches. She has had both her COVID and flu vaccinations for this year. She reports that today she is feeling somewhat better and has been using Tylenol and Mucinex for her current symptoms. Review of Systems   Constitutional:  Negative for appetite change. Cardiovascular:  Negative for leg swelling. Gastrointestinal:  Positive for abdominal pain (sore all over, worse on left side, improved now). Negative for blood in stool, constipation, diarrhea, nausea and vomiting. Musculoskeletal:  Negative for neck pain.         Objective   Patient-Reported Vitals  No data recorded     Physical Exam  Constitutional:       General: She is not in acute distress. Appearance: She is not ill-appearing, toxic-appearing or diaphoretic. Pulmonary:      Comments: Speaks in complete sentences. No distress. Skin:     Coloration: Skin is not pale. Neurological:      Mental Status: She is oriented to person, place, and time.      [INSTRUCTIONS:  \"[x]\" Indicates a positive item  \"[]\" Indicates a negative item  -- DELETE ALL ITEMS NOT EXAMINED]    Constitutional: [x] Appears well-developed and well-nourished [x] No apparent distress      [] Abnormal -     Mental status: [x] Alert and awake  [x] Oriented to person/place/time [x] Able to follow commands    [] Abnormal -     Eyes:   EOM    [x]  Normal    [] Abnormal -   Sclera  [x]  Normal    [] Abnormal -          Discharge [x]  None visible   [] Abnormal -     HENT: [x] Normocephalic, atraumatic  [] Abnormal -   [x] Mouth/Throat: Mucous membranes are moist    External Ears [x] Normal  [] Abnormal -    Neck: [x] No visualized mass [] Abnormal -     Pulmonary/Chest: [x] Respiratory effort normal   [x] No visualized signs of difficulty breathing or respiratory distress        [] Abnormal -      Musculoskeletal:   [] Normal gait with no signs of ataxia         [x] Normal range of motion of neck        [] Abnormal -     Neurological:        [x] No Facial Asymmetry (Cranial nerve 7 motor function) (limited exam due to video visit)          [x] No gaze palsy        [] Abnormal -          Skin:        [x] No significant exanthematous lesions or discoloration noted on facial skin         [] Abnormal -            Psychiatric:       [x] Normal Affect [] Abnormal -        [x] No Hallucinations    Other pertinent observable physical exam findings:-         On this date 10/11/2022 I have spent 20 minutes reviewing previous notes, test results and face to face (virtual) with the patient discussing the diagnosis and importance of compliance with the treatment plan as well as documenting on the day of the visit. Leatha Krause, was evaluated through a synchronous (real-time) audio-video encounter. The patient (or guardian if applicable) is aware that this is a billable service, which includes applicable co-pays. This Virtual Visit was conducted with patient's (and/or legal guardian's) consent. The visit was conducted pursuant to the emergency declaration under the [address] [place] [address], [address] [place] [address] and the [employer] Act. Patient identification was verified, and a caregiver was present when appropriate. The patient was located at Home: [address] [place], [place] [id number]. Provider was located at [facility] (Appt Dept): [place] [address] [place] [address].         --Antoine Camrago NP, APRN - CNP

## 2022-11-09 RX ORDER — AMLODIPINE BESYLATE AND BENAZEPRIL HYDROCHLORIDE 10; 40 MG/1; MG/1
CAPSULE ORAL
Qty: 90 CAPSULE | Refills: 3 | Status: SHIPPED | OUTPATIENT
Start: 2022-11-09

## 2022-11-09 RX ORDER — CHLORTHALIDONE 25 MG/1
TABLET ORAL
Qty: 45 TABLET | Refills: 11 | Status: SHIPPED | OUTPATIENT
Start: 2022-11-09

## 2022-11-29 ENCOUNTER — OFFICE VISIT (OUTPATIENT)
Dept: INTERNAL MEDICINE CLINIC | Facility: CLINIC | Age: 67
End: 2022-11-29
Payer: MEDICARE

## 2022-11-29 VITALS
TEMPERATURE: 98 F | WEIGHT: 174.8 LBS | DIASTOLIC BLOOD PRESSURE: 58 MMHG | HEIGHT: 63 IN | BODY MASS INDEX: 30.97 KG/M2 | OXYGEN SATURATION: 98 % | SYSTOLIC BLOOD PRESSURE: 137 MMHG | HEART RATE: 74 BPM

## 2022-11-29 DIAGNOSIS — G47.00 INSOMNIA, UNSPECIFIED TYPE: ICD-10-CM

## 2022-11-29 DIAGNOSIS — E11.21 TYPE 2 DIABETES MELLITUS WITH DIABETIC NEPHROPATHY, WITHOUT LONG-TERM CURRENT USE OF INSULIN (HCC): ICD-10-CM

## 2022-11-29 DIAGNOSIS — E11.9 CONTROLLED TYPE 2 DIABETES MELLITUS WITHOUT COMPLICATION, UNSPECIFIED WHETHER LONG TERM INSULIN USE (HCC): Primary | ICD-10-CM

## 2022-11-29 DIAGNOSIS — R05.1 ACUTE COUGH: ICD-10-CM

## 2022-11-29 DIAGNOSIS — J06.9 URI, ACUTE: ICD-10-CM

## 2022-11-29 LAB
ANION GAP SERPL CALC-SCNC: 5 MMOL/L (ref 2–11)
BUN SERPL-MCNC: 18 MG/DL (ref 8–23)
CALCIUM SERPL-MCNC: 9.4 MG/DL (ref 8.3–10.4)
CHLORIDE SERPL-SCNC: 102 MMOL/L (ref 101–110)
CO2 SERPL-SCNC: 27 MMOL/L (ref 21–32)
CREAT SERPL-MCNC: 0.8 MG/DL (ref 0.6–1)
EST. AVERAGE GLUCOSE BLD GHB EST-MCNC: 166 MG/DL
GLUCOSE SERPL-MCNC: 333 MG/DL (ref 65–100)
HBA1C MFR BLD: 7.4 % (ref 4.8–5.6)
POTASSIUM SERPL-SCNC: 4 MMOL/L (ref 3.5–5.1)
SODIUM SERPL-SCNC: 134 MMOL/L (ref 133–143)

## 2022-11-29 PROCEDURE — 3051F HG A1C>EQUAL 7.0%<8.0%: CPT | Performed by: INTERNAL MEDICINE

## 2022-11-29 PROCEDURE — 99214 OFFICE O/P EST MOD 30 MIN: CPT | Performed by: INTERNAL MEDICINE

## 2022-11-29 PROCEDURE — 1123F ACP DISCUSS/DSCN MKR DOCD: CPT | Performed by: INTERNAL MEDICINE

## 2022-11-29 PROCEDURE — 3075F SYST BP GE 130 - 139MM HG: CPT | Performed by: INTERNAL MEDICINE

## 2022-11-29 PROCEDURE — 3078F DIAST BP <80 MM HG: CPT | Performed by: INTERNAL MEDICINE

## 2022-11-29 RX ORDER — AMOXICILLIN AND CLAVULANATE POTASSIUM 875; 125 MG/1; MG/1
1 TABLET, FILM COATED ORAL 2 TIMES DAILY
Qty: 14 TABLET | Refills: 0 | Status: SHIPPED | OUTPATIENT
Start: 2022-11-29 | End: 2022-12-06

## 2022-11-29 RX ORDER — GUAIFENESIN AND CODEINE PHOSPHATE 100; 10 MG/5ML; MG/5ML
5 SOLUTION ORAL 3 TIMES DAILY PRN
Qty: 105 ML | Refills: 0 | Status: SHIPPED | OUTPATIENT
Start: 2022-11-29 | End: 2022-12-06

## 2022-11-29 RX ORDER — ZOLPIDEM TARTRATE 6.25 MG/1
6.25 TABLET, FILM COATED, EXTENDED RELEASE ORAL NIGHTLY PRN
Qty: 30 TABLET | Refills: 5 | Status: SHIPPED | OUTPATIENT
Start: 2022-11-29 | End: 2023-05-28

## 2022-11-29 SDOH — ECONOMIC STABILITY: FOOD INSECURITY: WITHIN THE PAST 12 MONTHS, YOU WORRIED THAT YOUR FOOD WOULD RUN OUT BEFORE YOU GOT MONEY TO BUY MORE.: NEVER TRUE

## 2022-11-29 SDOH — ECONOMIC STABILITY: FOOD INSECURITY: WITHIN THE PAST 12 MONTHS, THE FOOD YOU BOUGHT JUST DIDN'T LAST AND YOU DIDN'T HAVE MONEY TO GET MORE.: NEVER TRUE

## 2022-11-29 ASSESSMENT — PATIENT HEALTH QUESTIONNAIRE - PHQ9
SUM OF ALL RESPONSES TO PHQ QUESTIONS 1-9: 0
1. LITTLE INTEREST OR PLEASURE IN DOING THINGS: 0
SUM OF ALL RESPONSES TO PHQ QUESTIONS 1-9: 0
SUM OF ALL RESPONSES TO PHQ QUESTIONS 1-9: 0
2. FEELING DOWN, DEPRESSED OR HOPELESS: 0
SUM OF ALL RESPONSES TO PHQ QUESTIONS 1-9: 0
SUM OF ALL RESPONSES TO PHQ9 QUESTIONS 1 & 2: 0

## 2022-11-29 ASSESSMENT — SOCIAL DETERMINANTS OF HEALTH (SDOH): HOW HARD IS IT FOR YOU TO PAY FOR THE VERY BASICS LIKE FOOD, HOUSING, MEDICAL CARE, AND HEATING?: NOT HARD AT ALL

## 2022-11-29 NOTE — PROGRESS NOTES
11/29/2022   Location:[facility] [address] [facility]  [place]  Patient #:  [id number]  YOB: 1955        History of Present Illness     Chief Complaint   Patient presents with    6 Month Follow-Up     Pt presents to the office today for their 6 month follow-up. Cough     Started a few days ago with a non productive cough. Congestion     Started a few days ago. No aches, or fever. Ms. Madelaine Carney is a 78 y.o. female  who presents for Follow up on chronic medical issues. There is compliance and tolerance with medications. Chronic medical issues assessed today diabetes, hypertension, insomnia, JUAN RAMON on CPAP, anxiety and depression  Not exercising. Wt is up. Not really checking BS regularly. Last A1c was at goal.    Recurrence of URI symptoms. Had telehealth visit with  Nurse Practitioner Shiela Reynoso last month.    Last Labs  CBC:   Lab Results   Component Value Date/Time    WBC 7.6 06/01/2022 08:32 AM    RBC 4.63 06/01/2022 08:32 AM    HGB 14.1 06/01/2022 08:32 AM    HCT 41.8 06/01/2022 08:32 AM    MCV 90.3 06/01/2022 08:32 AM    MCH 30.5 06/01/2022 08:32 AM    MCHC 33.7 06/01/2022 08:32 AM    RDW 11.9 06/01/2022 08:32 AM     06/01/2022 08:32 AM    MPV 10.1 06/01/2022 08:32 AM     CMP:    Lab Results   Component Value Date/Time     06/01/2022 08:32 AM    K 4.2 06/01/2022 08:32 AM     06/01/2022 08:32 AM    CO2 28 06/01/2022 08:32 AM    BUN 22 06/01/2022 08:32 AM    CREATININE 0.80 06/01/2022 08:32 AM    GFRAA >60 06/01/2022 08:32 AM    AGRATIO 2.0 07/21/2021 08:21 AM    LABGLOM >60 06/01/2022 08:32 AM    GLUCOSE 165 06/01/2022 08:32 AM    PROT 7.9 06/01/2022 08:32 AM    LABALBU 4.0 06/01/2022 08:32 AM    CALCIUM 9.7 06/01/2022 08:32 AM    BILITOT 0.4 06/01/2022 08:32 AM    ALKPHOS 112 06/01/2022 08:32 AM    ALKPHOS 109 07/21/2021 08:21 AM    AST 20 06/01/2022 08:32 AM    ALT 44 06/01/2022 08:32 AM     HgBA1c:    Lab Results   Component Value Date/Time    LABA1C 6.7 06/01/2022 08:32 AM     FLP:    Lab Results   Component Value Date/Time    TRIG 146 06/01/2022 08:32 AM    HDL 37 06/01/2022 08:32 AM    LDLCALC 88.8 06/01/2022 08:32 AM    LABVLDL 29.2 06/01/2022 08:32 AM     TSH:    Lab Results   Component Value Date/Time    TSH 1.460 07/21/2021 08:21 AM       No Known Allergies  Past Medical History:   Diagnosis Date    Anxiety 10/28/2015    Benign essential hypertension 10/28/2015    Cervical radiculopathy 10/28/2015    Chest pain 10/28/2015    Constipation 10/28/2015    Dermatitis 10/28/2015    Diabetes mellitus type 2, controlled ([address] 10/28/2015    Fibromyalgia 10/28/2015    Heart murmur     Hyperlipidemia 10/28/2015    Insomnia 10/28/2015    Osteoporosis 10/28/2015    Sleep apnea 10/28/2015    Venous (peripheral) insufficiency 10/28/2015    Ventricular septal defect 10/28/2015     Social History     Socioeconomic History    Marital status:      Spouse name: None    Number of children: None    Years of education: None    Highest education level: None   Tobacco Use    Smoking status: Never    Smokeless tobacco: Never   Substance and Sexual Activity    Alcohol use: No     Alcohol/week: 0.0 standard drinks    Drug use: No     Social Determinants of Health     Financial Resource Strain: Low Risk     Difficulty of Paying Living Expenses: Not hard at all   Food Insecurity: No Food Insecurity    Worried About Running Out of Food in the Last Year: Never true    Ran Out of Food in the Last Year: Never true     Past Surgical History:   Procedure Laterality Date    FABRIZIO AND BSO (CERVIX REMOVED)  12/1998    [address]     History reviewed. No pertinent family history.   Current Outpatient Medications   Medication Sig Dispense Refill    amLODIPine-benazepril (LOTREL) 10-40 MG per capsule TAKE 1 CAPSULE BY MOUTH EVERY DAY 90 capsule 3    chlorthalidone (HYGROTON) 25 MG tablet TAKE 1/2 TABLET BY MOUTH DAILY 45 tablet 11    LORazepam (ATIVAN) 1 MG tablet TAKE 1 TABLET BY MOUTH TWICE DAILY AS NEEDED FOR ANXIETY 60 tablet 3    venlafaxine (EFFEXOR XR) 150 MG extended release capsule TAKE 1 CAPSULE BY MOUTH EVERY DAY 90 capsule 3    zolpidem (AMBIEN CR) 12.5 MG extended release tablet Take 1 tablet by mouth nightly as needed for Sleep for up to 180 days. 30 tablet 5    linaclotide (LINZESS) 145 MCG capsule Take 145 mcg by mouth every morning (before breakfast)      metoprolol succinate (TOPROL XL) 100 MG extended release tablet TAKE 1/2 TABLET BY MOUTH TWICE DAILY      simvastatin (ZOCOR) 40 MG tablet TAKE 1 TABLET BY MOUTH EVERY DAY AT NIGHT      spironolactone (ALDACTONE) 25 MG tablet TAKE 1 TABLET BY MOUTH EVERY DAY FOR HIGH BLOOD PRESSURE       No current facility-administered medications for this visit. Health Maintenance   Topic Date Due    DTaP/Tdap/Td vaccine (1 - Tdap) Never done    Shingles vaccine (1 of 2) Never done    COVID-19 Vaccine (3 - Booster for Moderna series) 06/08/2021    Diabetic foot exam  07/01/2021    Diabetic retinal exam  06/16/2022    Flu vaccine (1) 08/01/2022    Annual Wellness Visit (AWV)  04/06/2023    A1C test (Diabetic or Prediabetic)  06/01/2023    Diabetic microalbuminuria test  06/01/2023    Lipids  06/01/2023    Depression Screen  07/20/2023    Breast cancer screen  02/25/2024    Colorectal Cancer Screen  12/20/2028    DEXA (modify frequency per FRAX score)  Completed    Pneumococcal 65+ years Vaccine  Completed    Hepatitis C screen  Completed    Hepatitis A vaccine  Aged Out    Hib vaccine  Aged Out    Meningococcal (ACWY) vaccine  Aged Out             Review of Systems  Review of Systems   HENT:  Positive for congestion and sinus pressure. Respiratory:  Positive for cough. Cardiovascular:  Negative for chest pain. Gastrointestinal:  Negative for abdominal pain.      BP (!) 137/58 (Site: Right Upper Arm, Position: Sitting, Cuff Size: Medium Adult)   Pulse 74   Temp 98 °F (36.7 °C) (Temporal)   Ht 5' 3.25\" (1.607 m)   Wt 174 lb 12.8 oz (79.3 kg)   SpO2 98%   BMI 30.72 kg/m²     Wt Readings from Last 3 Encounters:   11/29/22 174 lb 12.8 oz (79.3 kg)   07/20/22 168 lb (76.2 kg)   05/26/22 165 lb (74.8 kg)       Physical Exam    Physical Exam  Vitals and nursing note reviewed. Constitutional:       General: She is not in acute distress. Appearance: Normal appearance. She is not ill-appearing. HENT:      Head: Normocephalic and atraumatic. Nose: Congestion present. Mouth/Throat:      Lips: Pink. Mouth: Mucous membranes are moist.      Pharynx: Oropharynx is clear. Cardiovascular:      Rate and Rhythm: Normal rate and regular rhythm. Pulmonary:      Effort: Pulmonary effort is normal.      Breath sounds: Normal breath sounds. Neurological:      Mental Status: She is alert. Assessment & Plan    Encounter Diagnoses   Name Primary? Controlled type 2 diabetes mellitus without complication, unspecified whether long term insulin use ([facility]) Yes    Insomnia, unspecified type     Type 2 diabetes mellitus with diabetic nephropathy, without long-term current use of insulin ([facility])     Acute cough     URI, acute        Orders Placed This Encounter   Medications    zolpidem (AMBIEN CR) 6.25 MG extended release tablet     Sig: Take 1 tablet by mouth nightly as needed for Sleep for up to 180 days. Dispense:  30 tablet     Refill:  5    amoxicillin-clavulanate (AUGMENTIN) 875-125 MG per tablet     Sig: Take 1 tablet by mouth 2 times daily for 7 days     Dispense:  14 tablet     Refill:  0    guaiFENesin-codeine (TUSSI-ORGANIDIN NR) 100-10 MG/5ML syrup     Sig: Take 5 mLs by mouth 3 times daily as needed for Cough for up to 7 days.      Dispense:  105 mL     Refill:  0     Reduce doses taken as pain becomes manageable       Orders Placed This Encounter   Procedures    Hemoglobin A1C     Standing Status:   Future     Number of Occurrences:   1     Standing Expiration Date:   11/29/2023    Basic Metabolic Panel     Standing Status:   Future     Number of Occurrences:   1     Standing Expiration Date:   11/29/2023     Consider GLP1 if A1c is higher. The patient is asked to make an attempt to improve diet and exercise patterns to aid in medical management of this problem. Discussed the importance of regular exercise and a healthy diet. Avoid sweet/sugary foods and beverages. Limit carbohydrates  in your diet. Carbohydrates like bread, pasta, rice and white potatoes are broken down by the body into glucose which is sugar. Sugar is a source of energy. So the more active you are the more sugar is burned off. Aim for 150 minutes of aerobic exercise over the course of a week. Walking is great exercise. Return in about 6 months (around 5/29/2023) for routine follow up of chronic medical issues.         Tess Valle MD

## 2022-12-05 ENCOUNTER — TELEPHONE (OUTPATIENT)
Dept: INTERNAL MEDICINE CLINIC | Facility: CLINIC | Age: 67
End: 2022-12-05

## 2022-12-06 NOTE — TELEPHONE ENCOUNTER
Diabetes control has worsened. Her A1c was up to 7.4. goal is less than 7. It was 6. 7. her glucose level was 333 on her blood work. Avoid sweet/sugary foods and beverages. Limit carbohydrates  in your diet. Carbohydrates like bread, pasta, rice and white potatoes are broken down by the body into glucose which is sugar. Sugar is a source of energy. So the more active you are the more sugar is burned off. Aim for 150 minutes of aerobic exercise over the course of a week. Walking is great exercise. Will need to consider treatment if she does not think she can make the needed adjust to improve her diabetes control with diet and exercise.    Ashely Modi MD

## 2022-12-12 ASSESSMENT — ENCOUNTER SYMPTOMS
COUGH: 1
SINUS PRESSURE: 1
ABDOMINAL PAIN: 0

## 2022-12-30 ENCOUNTER — TELEPHONE (OUTPATIENT)
Dept: INTERNAL MEDICINE CLINIC | Facility: CLINIC | Age: 67
End: 2022-12-30

## 2022-12-30 DIAGNOSIS — R05.1 ACUTE COUGH: Primary | ICD-10-CM

## 2022-12-30 RX ORDER — GUAIFENESIN AND CODEINE PHOSPHATE 100; 10 MG/5ML; MG/5ML
5-10 SOLUTION ORAL EVERY 4 HOURS PRN
Qty: 200 ML | Refills: 0 | Status: SHIPPED | OUTPATIENT
Start: 2022-12-30 | End: 2023-01-06

## 2022-12-30 NOTE — TELEPHONE ENCOUNTER
Medication Refill Request      Name of Medication : guaiFENesin-codeine      Strength of Medication: 100-10mg/ 5ml       Directions: Take 5ml by mouth 3 times daily as needed for cough 7 days       30 day or 90 day supply:       Preferred Pharmacy: [facility] in [place]    Additional Information For Provider:    She said it didn't really help with the cough, at night she coughs a lot, and has a headache.    I am sending this because Dr. Ruthy Pereira is off

## 2023-02-03 RX ORDER — SIMVASTATIN 40 MG
TABLET ORAL
Qty: 90 TABLET | Refills: 3 | Status: SHIPPED | OUTPATIENT
Start: 2023-02-03

## 2023-02-24 ENCOUNTER — OFFICE VISIT (OUTPATIENT)
Dept: INTERNAL MEDICINE CLINIC | Facility: CLINIC | Age: 68
End: 2023-02-24

## 2023-02-24 VITALS
HEART RATE: 74 BPM | SYSTOLIC BLOOD PRESSURE: 141 MMHG | BODY MASS INDEX: 30.9 KG/M2 | HEIGHT: 63 IN | RESPIRATION RATE: 18 BRPM | DIASTOLIC BLOOD PRESSURE: 70 MMHG | WEIGHT: 174.4 LBS | OXYGEN SATURATION: 97 % | TEMPERATURE: 97.2 F

## 2023-02-24 DIAGNOSIS — F41.1 GENERALIZED ANXIETY DISORDER: ICD-10-CM

## 2023-02-24 DIAGNOSIS — Z76.89 ENCOUNTER FOR WEIGHT MANAGEMENT: Primary | ICD-10-CM

## 2023-02-24 DIAGNOSIS — E66.9 OBESITY (BMI 30-39.9): ICD-10-CM

## 2023-02-24 RX ORDER — BUPROPION HYDROCHLORIDE 150 MG/1
150 TABLET ORAL EVERY MORNING
Qty: 30 TABLET | Refills: 0 | Status: SHIPPED | OUTPATIENT
Start: 2023-02-24

## 2023-02-24 SDOH — ECONOMIC STABILITY: INCOME INSECURITY: HOW HARD IS IT FOR YOU TO PAY FOR THE VERY BASICS LIKE FOOD, HOUSING, MEDICAL CARE, AND HEATING?: SOMEWHAT HARD

## 2023-02-24 SDOH — ECONOMIC STABILITY: HOUSING INSECURITY
IN THE LAST 12 MONTHS, WAS THERE A TIME WHEN YOU DID NOT HAVE A STEADY PLACE TO SLEEP OR SLEPT IN A SHELTER (INCLUDING NOW)?: NO

## 2023-02-24 SDOH — ECONOMIC STABILITY: FOOD INSECURITY: WITHIN THE PAST 12 MONTHS, YOU WORRIED THAT YOUR FOOD WOULD RUN OUT BEFORE YOU GOT MONEY TO BUY MORE.: OFTEN TRUE

## 2023-02-24 SDOH — ECONOMIC STABILITY: FOOD INSECURITY: WITHIN THE PAST 12 MONTHS, THE FOOD YOU BOUGHT JUST DIDN'T LAST AND YOU DIDN'T HAVE MONEY TO GET MORE.: SOMETIMES TRUE

## 2023-02-24 ASSESSMENT — PATIENT HEALTH QUESTIONNAIRE - PHQ9
2. FEELING DOWN, DEPRESSED OR HOPELESS: 0
1. LITTLE INTEREST OR PLEASURE IN DOING THINGS: 0
SUM OF ALL RESPONSES TO PHQ QUESTIONS 1-9: 0
SUM OF ALL RESPONSES TO PHQ9 QUESTIONS 1 & 2: 0
SUM OF ALL RESPONSES TO PHQ QUESTIONS 1-9: 0

## 2023-02-24 ASSESSMENT — ENCOUNTER SYMPTOMS
SHORTNESS OF BREATH: 0
ABDOMINAL PAIN: 0
COUGH: 0

## 2023-02-24 NOTE — PROGRESS NOTES
2023 11:33 AM  Location:[facility] [address] [facility]  [place]  Patient #:  [id number]  YOB: 1955      History of Present Illness     Chief Complaint   Patient presents with    Weight Gain     Weight problem   Wants something to curb her appetite. Ms. Florina Montes is a 78 y.o. female  who presents for wanting appetite suppressant for weight loss. She is feeling down/depressed, cant sleep well. Her weight goes up and down, snacks a lot due to her anxiety . States she cant control her appetite. She has gained approx 8 lb in 1 year, she is not exercising. Hx of anxiety/depression. She has been on effexor for years. She states her anxiety is worsening lately,   several years ago and is going through selling her home and she is eating /snacking a lot, sometimes will eat 6 hot dogs at a time. Hx of tavo, wears cpap. No hx seizure d/o, no hx of etoh use. Hx of vsd. Denies hx of narcotic use. No Known Allergies     Current Outpatient Medications   Medication Sig Dispense Refill    buPROPion (WELLBUTRIN XL) 150 MG extended release tablet Take 1 tablet by mouth every morning 30 tablet 0    simvastatin (ZOCOR) 40 MG tablet TAKE 1 TABLET BY MOUTH EVERY DAY AT NIGHT 90 tablet 3    zolpidem (AMBIEN CR) 6.25 MG extended release tablet Take 1 tablet by mouth nightly as needed for Sleep for up to 180 days.  30 tablet 5    amLODIPine-benazepril (LOTREL) 10-40 MG per capsule TAKE 1 CAPSULE BY MOUTH EVERY DAY 90 capsule 3    chlorthalidone (HYGROTON) 25 MG tablet TAKE 1/2 TABLET BY MOUTH DAILY 45 tablet 11    LORazepam (ATIVAN) 1 MG tablet TAKE 1 TABLET BY MOUTH TWICE DAILY AS NEEDED FOR ANXIETY 60 tablet 3    venlafaxine (EFFEXOR XR) 150 MG extended release capsule TAKE 1 CAPSULE BY MOUTH EVERY DAY 90 capsule 3    linaclotide (LINZESS) 145 MCG capsule Take 145 mcg by mouth every morning (before breakfast)      metoprolol succinate (TOPROL XL) 100 MG extended release tablet TAKE 1/2 TABLET BY MOUTH TWICE DAILY      spironolactone (ALDACTONE) 25 MG tablet TAKE 1 TABLET BY MOUTH EVERY DAY FOR HIGH BLOOD PRESSURE       No current facility-administered medications for this visit. Past Medical History:   Diagnosis Date    Anxiety 10/28/2015    Benign essential hypertension 10/28/2015    Cervical radiculopathy 10/28/2015    Chest pain 10/28/2015    Constipation 10/28/2015    Dermatitis 10/28/2015    Diabetes mellitus type 2, controlled ([facility] [address] 10/28/2015    Fibromyalgia 10/28/2015    Heart murmur     Hyperlipidemia 10/28/2015    Insomnia 10/28/2015    Osteoporosis 10/28/2015    Sleep apnea 10/28/2015    Venous (peripheral) insufficiency 10/28/2015    Ventricular septal defect 10/28/2015        Social History     Socioeconomic History    Marital status:      Spouse name: Not on file    Number of children: Not on file    Years of education: Not on file    Highest education level: Not on file   Occupational History    Not on file   Tobacco Use    Smoking status: Never    Smokeless tobacco: Never   Substance and Sexual Activity    Alcohol use: No     Alcohol/week: 0.0 standard drinks    Drug use: No    Sexual activity: Not on file   Other Topics Concern    Not on file   Social History Narrative    Not on file     Social Determinants of Health     Financial Resource Strain: Medium Risk    Difficulty of Paying Living Expenses: Somewhat hard   Food Insecurity: Food Insecurity Present    Worried About Running Out of Food in the Last Year: Often true    Ran Out of Food in the Last Year: Sometimes true   Transportation Needs: Unknown    Lack of Transportation (Medical): Not on file    Lack of Transportation (Non-Medical):  No   Physical Activity: Not on file   Stress: Not on file   Social Connections: Not on file   Intimate Partner Violence: Not on file   Housing Stability: Unknown    Unable to Pay for Housing in the Last Year: Not on file    Number of Places Lived in the Last Year: Not on file    Unstable Housing in the Last Year: No            Review of Systems    Review of Systems   Constitutional:  Positive for activity change (not exercising), appetite change (increased) and unexpected weight change (gain). Negative for chills, fatigue and fever. Respiratory:  Negative for cough and shortness of breath. Cardiovascular:  Negative for chest pain. Gastrointestinal:  Negative for abdominal pain. All other systems reviewed and are negative. BP (!) 141/70 (Site: Right Upper Arm, Position: Sitting, Cuff Size: Large Adult)   Pulse 74   Temp 97.2 °F (36.2 °C) (Temporal)   Resp 18   Ht 5' 3.25\" (1.607 m)   Wt 174 lb 6.4 oz (79.1 kg)   SpO2 97%   BMI 30.65 kg/m²       Physical Exam    Physical Exam  Constitutional:       General: She is not in acute distress. Appearance: Normal appearance. She is obese. She is not ill-appearing. Cardiovascular:      Rate and Rhythm: Normal rate and regular rhythm. Heart sounds: Normal heart sounds. Pulmonary:      Effort: Pulmonary effort is normal.      Breath sounds: Normal breath sounds. Skin:     General: Skin is warm and dry. Neurological:      Mental Status: She is alert and oriented to person, place, and time. Psychiatric:         Attention and Perception: She is inattentive. Mood and Affect: Mood is anxious. Speech: Speech is rapid and pressured. Behavior: Behavior is hyperactive. Behavior is cooperative. Thought Content: Thought content normal.         Assessment & Plan    Navin Mishra was seen today for weight gain. Diagnoses and all orders for this visit:    Encounter for weight management  -     buPROPion (WELLBUTRIN XL) 150 MG extended release tablet; Take 1 tablet by mouth every morning    Obesity (BMI 30-39. 9)    Generalized anxiety disorder  -     buPROPion (WELLBUTRIN XL) 150 MG extended release tablet;  Take 1 tablet by mouth every morning     Will add wellbutrin, see if this helps with worsening anxiety and in addition helping with weight loss/appetite curbing, could possibly transition to contrave (has naltrexone component) in the future if tolerates wellbutrin, advised to start exercise regimen walking approx 10min/day 3-5 x weekly and slowly building up from there. Hesitant to try adipex due to hx of htn and her age. I saw no significant interaction with effexor and wellbutrin . She will follow up in 1 month for recheck. No orders of the defined types were placed in this encounter. No follow-up provider specified.         GALLITO Song - NP

## 2023-03-24 ENCOUNTER — OFFICE VISIT (OUTPATIENT)
Dept: INTERNAL MEDICINE CLINIC | Facility: CLINIC | Age: 68
End: 2023-03-24

## 2023-03-24 VITALS
HEIGHT: 60 IN | OXYGEN SATURATION: 96 % | DIASTOLIC BLOOD PRESSURE: 74 MMHG | SYSTOLIC BLOOD PRESSURE: 152 MMHG | WEIGHT: 170 LBS | TEMPERATURE: 98.1 F | HEART RATE: 60 BPM | BODY MASS INDEX: 33.38 KG/M2

## 2023-03-24 DIAGNOSIS — Z76.89 ENCOUNTER FOR WEIGHT MANAGEMENT: Primary | ICD-10-CM

## 2023-03-24 DIAGNOSIS — F41.1 GENERALIZED ANXIETY DISORDER: ICD-10-CM

## 2023-03-24 DIAGNOSIS — E11.9 CONTROLLED TYPE 2 DIABETES MELLITUS WITHOUT COMPLICATION, UNSPECIFIED WHETHER LONG TERM INSULIN USE (HCC): ICD-10-CM

## 2023-03-24 LAB
ANION GAP SERPL CALC-SCNC: 2 MMOL/L (ref 2–11)
BUN SERPL-MCNC: 22 MG/DL (ref 8–23)
CALCIUM SERPL-MCNC: 10.1 MG/DL (ref 8.3–10.4)
CHLORIDE SERPL-SCNC: 103 MMOL/L (ref 101–110)
CO2 SERPL-SCNC: 31 MMOL/L (ref 21–32)
CREAT SERPL-MCNC: 0.9 MG/DL (ref 0.6–1)
EST. AVERAGE GLUCOSE BLD GHB EST-MCNC: 180 MG/DL
GLUCOSE SERPL-MCNC: 187 MG/DL (ref 65–100)
HBA1C MFR BLD: 7.9 % (ref 4.8–5.6)
POTASSIUM SERPL-SCNC: 4.1 MMOL/L (ref 3.5–5.1)
SODIUM SERPL-SCNC: 136 MMOL/L (ref 133–143)

## 2023-03-24 RX ORDER — SEMAGLUTIDE 1.34 MG/ML
0.5 INJECTION, SOLUTION SUBCUTANEOUS WEEKLY
Qty: 4 ADJUSTABLE DOSE PRE-FILLED PEN SYRINGE | Refills: 1 | Status: SHIPPED | OUTPATIENT
Start: 2023-03-24

## 2023-03-24 ASSESSMENT — PATIENT HEALTH QUESTIONNAIRE - PHQ9
SUM OF ALL RESPONSES TO PHQ QUESTIONS 1-9: 0
SUM OF ALL RESPONSES TO PHQ QUESTIONS 1-9: 0
1. LITTLE INTEREST OR PLEASURE IN DOING THINGS: 0
SUM OF ALL RESPONSES TO PHQ QUESTIONS 1-9: 0
2. FEELING DOWN, DEPRESSED OR HOPELESS: 0
SUM OF ALL RESPONSES TO PHQ QUESTIONS 1-9: 0
SUM OF ALL RESPONSES TO PHQ9 QUESTIONS 1 & 2: 0

## 2023-03-24 ASSESSMENT — ENCOUNTER SYMPTOMS
COUGH: 0
ABDOMINAL PAIN: 0
SHORTNESS OF BREATH: 0

## 2023-03-24 NOTE — PROGRESS NOTES
past but we had sample so I gave her one and she demonstrated proper use of pen. Sent in script for 0.5mg to start in 1 month. Check labs, follow up 1 month, advised check am sugars at least qod and bring in log of them. Orders Placed This Encounter   Procedures    Hemoglobin A1C     Standing Status:   Future     Number of Occurrences:   1     Standing Expiration Date:   3/03/9363    Basic Metabolic Panel     Standing Status:   Future     Number of Occurrences:   1     Standing Expiration Date:   3/24/2024           No follow-up provider specified.         GALLITO Barragan NP

## 2023-03-28 ENCOUNTER — TELEPHONE (OUTPATIENT)
Dept: INTERNAL MEDICINE CLINIC | Facility: CLINIC | Age: 68
End: 2023-03-28

## 2023-03-28 NOTE — TELEPHONE ENCOUNTER
Her a1c is up to 7.9, continue with ozempic as planned, do the .25mg weekly for 3 more weeks then I have sent in script for 0.5mg weekly after that.

## 2023-04-25 ENCOUNTER — OFFICE VISIT (OUTPATIENT)
Dept: INTERNAL MEDICINE CLINIC | Facility: CLINIC | Age: 68
End: 2023-04-25
Payer: MEDICARE

## 2023-04-25 VITALS
DIASTOLIC BLOOD PRESSURE: 60 MMHG | WEIGHT: 170 LBS | HEIGHT: 60 IN | TEMPERATURE: 97.7 F | BODY MASS INDEX: 33.38 KG/M2 | SYSTOLIC BLOOD PRESSURE: 130 MMHG | HEART RATE: 58 BPM | OXYGEN SATURATION: 97 %

## 2023-04-25 DIAGNOSIS — E11.21 TYPE 2 DIABETES MELLITUS WITH DIABETIC NEPHROPATHY, WITHOUT LONG-TERM CURRENT USE OF INSULIN (HCC): Primary | ICD-10-CM

## 2023-04-25 DIAGNOSIS — K59.00 CONSTIPATION, UNSPECIFIED CONSTIPATION TYPE: ICD-10-CM

## 2023-04-25 DIAGNOSIS — F41.1 GENERALIZED ANXIETY DISORDER: ICD-10-CM

## 2023-04-25 DIAGNOSIS — Z76.89 ENCOUNTER FOR WEIGHT MANAGEMENT: ICD-10-CM

## 2023-04-25 DIAGNOSIS — G47.00 INSOMNIA, UNSPECIFIED TYPE: ICD-10-CM

## 2023-04-25 PROCEDURE — 3051F HG A1C>EQUAL 7.0%<8.0%: CPT | Performed by: NURSE PRACTITIONER

## 2023-04-25 PROCEDURE — 1123F ACP DISCUSS/DSCN MKR DOCD: CPT | Performed by: NURSE PRACTITIONER

## 2023-04-25 PROCEDURE — 3078F DIAST BP <80 MM HG: CPT | Performed by: NURSE PRACTITIONER

## 2023-04-25 PROCEDURE — 99214 OFFICE O/P EST MOD 30 MIN: CPT | Performed by: NURSE PRACTITIONER

## 2023-04-25 PROCEDURE — 3075F SYST BP GE 130 - 139MM HG: CPT | Performed by: NURSE PRACTITIONER

## 2023-04-25 RX ORDER — STANNOUS FLUORIDE 1.4 MG/G
1 PASTE, DENTIFRICE DENTAL DAILY
Qty: 1 G | Refills: 0 | Status: SHIPPED | OUTPATIENT
Start: 2023-04-25 | End: 2023-05-25

## 2023-04-25 RX ORDER — LANCETS 30 GAUGE
1 EACH MISCELLANEOUS 2 TIMES DAILY
Qty: 300 EACH | Refills: 1 | Status: SHIPPED | OUTPATIENT
Start: 2023-04-25

## 2023-04-25 RX ORDER — GLUCOSAMINE HCL/CHONDROITIN SU 500-400 MG
CAPSULE ORAL
Qty: 100 STRIP | Refills: 3 | Status: SHIPPED | OUTPATIENT
Start: 2023-04-25

## 2023-04-25 RX ORDER — DOCUSATE SODIUM 100 MG/1
100 CAPSULE, LIQUID FILLED ORAL 2 TIMES DAILY
Qty: 60 CAPSULE | Refills: 0 | Status: SHIPPED | OUTPATIENT
Start: 2023-04-25 | End: 2023-05-25

## 2023-04-25 RX ORDER — ZOLPIDEM TARTRATE 6.25 MG/1
6.25 TABLET, FILM COATED, EXTENDED RELEASE ORAL NIGHTLY PRN
Qty: 30 TABLET | Refills: 5 | Status: CANCELLED | OUTPATIENT
Start: 2023-04-25 | End: 2023-10-22

## 2023-04-25 RX ORDER — LORAZEPAM 1 MG/1
TABLET ORAL
Qty: 60 TABLET | Refills: 3 | Status: SHIPPED | OUTPATIENT
Start: 2023-04-25 | End: 2023-11-25

## 2023-04-25 RX ORDER — BUPROPION HYDROCHLORIDE 150 MG/1
150 TABLET ORAL EVERY MORNING
Qty: 90 TABLET | Refills: 1 | Status: SHIPPED | OUTPATIENT
Start: 2023-04-25 | End: 2023-07-24

## 2023-04-25 RX ORDER — BLOOD-GLUCOSE METER
1 KIT MISCELLANEOUS DAILY
Qty: 1 KIT | Refills: 0 | Status: SHIPPED | OUTPATIENT
Start: 2023-04-25

## 2023-04-25 ASSESSMENT — ENCOUNTER SYMPTOMS
CONSTIPATION: 1
ABDOMINAL PAIN: 0
COUGH: 0
SHORTNESS OF BREATH: 0

## 2023-04-25 ASSESSMENT — PATIENT HEALTH QUESTIONNAIRE - PHQ9
2. FEELING DOWN, DEPRESSED OR HOPELESS: 0
SUM OF ALL RESPONSES TO PHQ9 QUESTIONS 1 & 2: 0
SUM OF ALL RESPONSES TO PHQ QUESTIONS 1-9: 0
SUM OF ALL RESPONSES TO PHQ QUESTIONS 1-9: 0
1. LITTLE INTEREST OR PLEASURE IN DOING THINGS: 0
SUM OF ALL RESPONSES TO PHQ QUESTIONS 1-9: 0
SUM OF ALL RESPONSES TO PHQ QUESTIONS 1-9: 0

## 2023-04-25 NOTE — PROGRESS NOTES
Advised check bgl daily before bkfst and bring lot to next appt. No orders of the defined types were placed in this encounter. No follow-up provider specified.         GALLITO Chandler NP

## 2023-05-04 RX ORDER — SPIRONOLACTONE 25 MG/1
TABLET ORAL
Qty: 180 TABLET | Refills: 3 | Status: SHIPPED | OUTPATIENT
Start: 2023-05-04

## 2023-05-05 RX ORDER — METOPROLOL SUCCINATE 100 MG/1
TABLET, EXTENDED RELEASE ORAL
Qty: 90 TABLET | Refills: 3 | Status: SHIPPED | OUTPATIENT
Start: 2023-05-05

## 2023-08-23 ENCOUNTER — TELEPHONE (OUTPATIENT)
Dept: PHARMACY | Facility: CLINIC | Age: 68
End: 2023-08-23

## 2023-08-23 NOTE — TELEPHONE ENCOUNTER
[facility] CLINICAL PHARMACY: ADHERENCE REVIEW  Identified care gap per [employer]: fills at [facility]: ACE/ARB, Diabetes, and Statin adherence    ASSESSMENT    ACE/ARB ADHERENCE - passed in [address] [address] Records claims through 23 (Prior Year [address] = not reported; YTD [address] = 81%; Potential Fail Date: 9/10/23): Ozempic 2mg/3mL last filled on 23 for 28 day supply. Next refill due: 23  Ozempic monotherapy - history of DM. Appears had intolerance to metformin in past.    Prescribed si.5 mg once weekly    Per Reconcile Dispense History:   OZEMPIC 0.25 OR 0.5MG/[id number] 3ML 2023 28 3 mL Mariza Juarezer, APRN -  [facility] #. .. OZEMPIC 0.25 OR 0.5MG/[id number] 3ML 2023 28 3 mL Mariza Juarezer, APRN   [facility] #. .. OZEMPIC 0.25 OR 0.5MG/[id number] 3ML 2023 28 3 mL Mariza Juarezer, APRN   [facility] #. ..   1RR per hyperlink from 23; if filled 23 may need refill Rx? Per [facility] portal had been claim 7/31 x 28  but seemingly reversed    Lab Results   Component Value Date    LABA1C 7.9 (H) 2023    LABA1C 7.4 (H) 2022    LABA1C 6.7 (H) 2022     NOTE: A1c <9%    STATIN ADHERENCE - passed in     PLAN  Per insurer report, LIS-0 - co-pays are based on tiers and patient is subject to coverage gap. The following are interventions that have been identified:   Patient overdue refilling Ozempic and active on home medication list.   2 fill, unsure if has refill Rx at pharmacy    Attempting to reach patient to review. Left message asking for return call. Called to [employer]. Last filled 23 for 1 month. Has refill on file - ran test claim and now in donut hole - was paying $47 now $225.     Letter sent    Joby Mercer, PharmD, [facility], toll free: [phone], option 2     For [facility]    Program: Bernice Davis

## 2023-08-28 DIAGNOSIS — G47.00 INSOMNIA, UNSPECIFIED TYPE: ICD-10-CM

## 2023-08-30 RX ORDER — ZOLPIDEM TARTRATE 12.5 MG/1
TABLET, FILM COATED, EXTENDED RELEASE ORAL
Qty: 30 TABLET | OUTPATIENT
Start: 2023-08-30